# Patient Record
Sex: FEMALE | Race: BLACK OR AFRICAN AMERICAN | NOT HISPANIC OR LATINO | Employment: FULL TIME | URBAN - METROPOLITAN AREA
[De-identification: names, ages, dates, MRNs, and addresses within clinical notes are randomized per-mention and may not be internally consistent; named-entity substitution may affect disease eponyms.]

---

## 2024-06-24 ENCOUNTER — HOSPITAL ENCOUNTER (EMERGENCY)
Facility: OTHER | Age: 39
Discharge: HOME | End: 2024-06-24
Attending: EMERGENCY MEDICINE
Payer: MEDICAID

## 2024-06-24 VITALS
DIASTOLIC BLOOD PRESSURE: 68 MMHG | BODY MASS INDEX: 21.17 KG/M2 | SYSTOLIC BLOOD PRESSURE: 109 MMHG | HEART RATE: 70 BPM | OXYGEN SATURATION: 99 % | HEIGHT: 64 IN | RESPIRATION RATE: 18 BRPM | WEIGHT: 124 LBS | TEMPERATURE: 98 F

## 2024-06-24 DIAGNOSIS — R22.0 FACIAL SWELLING: ICD-10-CM

## 2024-06-24 DIAGNOSIS — J06.9 UPPER RESPIRATORY TRACT INFECTION, UNSPECIFIED TYPE: Primary | ICD-10-CM

## 2024-06-24 DIAGNOSIS — R06.02 SHORTNESS OF BREATH: ICD-10-CM

## 2024-06-24 DIAGNOSIS — Z86.39 HISTORY OF HYPERTHYROIDISM: ICD-10-CM

## 2024-06-24 LAB
ALBUMIN SERPL BCP-MCNC: 3.9 G/DL (ref 3.5–5.2)
ALP SERPL-CCNC: 43 U/L (ref 55–135)
ALT SERPL W/O P-5'-P-CCNC: 16 U/L (ref 10–44)
ANION GAP SERPL CALC-SCNC: 9 MMOL/L (ref 8–16)
AST SERPL-CCNC: 26 U/L (ref 10–40)
B-HCG UR QL: NEGATIVE
BASOPHILS # BLD AUTO: 0.02 K/UL (ref 0–0.2)
BASOPHILS NFR BLD: 0.4 % (ref 0–1.9)
BILIRUB SERPL-MCNC: 0.2 MG/DL (ref 0.1–1)
BILIRUB UR QL STRIP: NEGATIVE
BUN SERPL-MCNC: 9 MG/DL (ref 6–20)
CALCIUM SERPL-MCNC: 9 MG/DL (ref 8.7–10.5)
CHLORIDE SERPL-SCNC: 105 MMOL/L (ref 95–110)
CLARITY UR: CLEAR
CO2 SERPL-SCNC: 23 MMOL/L (ref 23–29)
COLOR UR: YELLOW
CREAT SERPL-MCNC: 1.1 MG/DL (ref 0.5–1.4)
CTP QC/QA: YES
DIFFERENTIAL METHOD BLD: ABNORMAL
EOSINOPHIL # BLD AUTO: 0.2 K/UL (ref 0–0.5)
EOSINOPHIL NFR BLD: 2.9 % (ref 0–8)
ERYTHROCYTE [DISTWIDTH] IN BLOOD BY AUTOMATED COUNT: 14.8 % (ref 11.5–14.5)
EST. GFR  (NO RACE VARIABLE): >60 ML/MIN/1.73 M^2
GLUCOSE SERPL-MCNC: 80 MG/DL (ref 70–110)
GLUCOSE UR QL STRIP: NEGATIVE
HCT VFR BLD AUTO: 34.1 % (ref 37–48.5)
HGB BLD-MCNC: 12.1 G/DL (ref 12–16)
HGB UR QL STRIP: ABNORMAL
IMM GRANULOCYTES # BLD AUTO: 0 K/UL (ref 0–0.04)
IMM GRANULOCYTES NFR BLD AUTO: 0 % (ref 0–0.5)
KETONES UR QL STRIP: NEGATIVE
LEUKOCYTE ESTERASE UR QL STRIP: NEGATIVE
LYMPHOCYTES # BLD AUTO: 1.8 K/UL (ref 1–4.8)
LYMPHOCYTES NFR BLD: 32.1 % (ref 18–48)
MCH RBC QN AUTO: 31.8 PG (ref 27–31)
MCHC RBC AUTO-ENTMCNC: 35.5 G/DL (ref 32–36)
MCV RBC AUTO: 90 FL (ref 82–98)
MONOCYTES # BLD AUTO: 0.6 K/UL (ref 0.3–1)
MONOCYTES NFR BLD: 11 % (ref 4–15)
NEUTROPHILS # BLD AUTO: 2.9 K/UL (ref 1.8–7.7)
NEUTROPHILS NFR BLD: 53.6 % (ref 38–73)
NITRITE UR QL STRIP: NEGATIVE
NRBC BLD-RTO: 0 /100 WBC
PH UR STRIP: 6 [PH] (ref 5–8)
PLATELET # BLD AUTO: 200 K/UL (ref 150–450)
PMV BLD AUTO: 10.3 FL (ref 9.2–12.9)
POC MOLECULAR INFLUENZA A AGN: NEGATIVE
POC MOLECULAR INFLUENZA B AGN: NEGATIVE
POTASSIUM SERPL-SCNC: 3.5 MMOL/L (ref 3.5–5.1)
PROT SERPL-MCNC: 7.6 G/DL (ref 6–8.4)
PROT UR QL STRIP: NEGATIVE
RBC # BLD AUTO: 3.81 M/UL (ref 4–5.4)
SARS-COV-2 RDRP RESP QL NAA+PROBE: NEGATIVE
SODIUM SERPL-SCNC: 137 MMOL/L (ref 136–145)
SP GR UR STRIP: 1.01 (ref 1–1.03)
T4 FREE SERPL-MCNC: <0.4 NG/DL (ref 0.71–1.51)
TSH SERPL DL<=0.005 MIU/L-ACNC: 119.23 UIU/ML (ref 0.4–4)
URN SPEC COLLECT METH UR: ABNORMAL
UROBILINOGEN UR STRIP-ACNC: NEGATIVE EU/DL
WBC # BLD AUTO: 5.46 K/UL (ref 3.9–12.7)

## 2024-06-24 PROCEDURE — 84443 ASSAY THYROID STIM HORMONE: CPT

## 2024-06-24 PROCEDURE — 87635 SARS-COV-2 COVID-19 AMP PRB: CPT | Performed by: EMERGENCY MEDICINE

## 2024-06-24 PROCEDURE — 25000003 PHARM REV CODE 250

## 2024-06-24 PROCEDURE — 80053 COMPREHEN METABOLIC PANEL: CPT

## 2024-06-24 PROCEDURE — 93005 ELECTROCARDIOGRAM TRACING: CPT

## 2024-06-24 PROCEDURE — 81003 URINALYSIS AUTO W/O SCOPE: CPT | Performed by: EMERGENCY MEDICINE

## 2024-06-24 PROCEDURE — 85025 COMPLETE CBC W/AUTO DIFF WBC: CPT

## 2024-06-24 PROCEDURE — 99285 EMERGENCY DEPT VISIT HI MDM: CPT | Mod: 25

## 2024-06-24 PROCEDURE — 81025 URINE PREGNANCY TEST: CPT

## 2024-06-24 PROCEDURE — 93010 ELECTROCARDIOGRAM REPORT: CPT | Mod: ,,, | Performed by: INTERNAL MEDICINE

## 2024-06-24 PROCEDURE — 96360 HYDRATION IV INFUSION INIT: CPT

## 2024-06-24 PROCEDURE — 84439 ASSAY OF FREE THYROXINE: CPT

## 2024-06-24 RX ORDER — ACETAMINOPHEN 500 MG
1000 TABLET ORAL
Status: COMPLETED | OUTPATIENT
Start: 2024-06-24 | End: 2024-06-24

## 2024-06-24 RX ADMIN — ACETAMINOPHEN 1000 MG: 500 TABLET ORAL at 11:06

## 2024-06-24 RX ADMIN — SODIUM CHLORIDE 1000 ML: 900 INJECTION, SOLUTION INTRAVENOUS at 12:06

## 2024-06-24 NOTE — DISCHARGE INSTRUCTIONS
Follow up with your primary care doctor. We will send a referral to endocrine to follow up with your history of hyperthyroidism. Return to the ED for new or worsening symptoms    76

## 2024-06-24 NOTE — ED PROVIDER NOTES
"Encounter Date: 6/24/2024       History     Chief Complaint   Patient presents with    Facial Swelling     Hx of hyperthyroidism. Swelling noted to throat and generalized face "for a while." Reports taking prescribed methimazole with no relief in symptoms. Pt also reporting SOB. Pt speaking in clear and complete sentences.      Pt is a 39 year old female with PMHx significant for hyperthyroidism and HIV who presents for evaluation of multiple complaints. She reports subjective fever, chills, cough, congestion, and rhinorrhea that have persisted for the last week. Reports recent sick contact with son with similar symptoms. States she noticed facial swelling 1 day ago, which prompted presentation to the ED. She denies chest pain, nausea, vomiting, abdominal pain, numbness, or weakness     The history is provided by the patient.     Review of patient's allergies indicates:   Allergen Reactions    Pcn [penicillins] Hives     Past Medical History:   Diagnosis Date    Asthma     Asthma 9/10/2020 11:57:30 AM    [facility] Historical - Respiratory: Asthma-No Additional Notes    Asthma 9/10/2020 11:57:30 AM    [facility] Historical - Respiratory: Asthma-No Additional Notes    Maternal anemia complicating pregnancy, childbirth, or the puerperium 9/10/2020 11:57:38 AM    [facility] Historical - Unknown: Sickle cell trait in mother affecting pregnancy-No Additional Notes    Maternal anemia complicating pregnancy, childbirth, or the puerperium 9/10/2020 11:57:38 AM    [facility] Historical - Unknown: Sickle cell trait in mother affecting pregnancy-No Additional Notes     History reviewed. No pertinent surgical history.  Family History   Problem Relation Name Age of Onset    Diabetes Mother      Hypertension Father       Social History     Tobacco Use    Smoking status: Every Day     Current packs/day: 0.50     Types: Cigarettes    Smokeless tobacco: Never   Substance Use Topics    Alcohol use: No    Drug use: No "     Review of Systems    Physical Exam     Initial Vitals [06/24/24 1108]   BP Pulse Resp Temp SpO2   107/74 87 18 98.1 °F (36.7 °C) 97 %      MAP       --         Physical Exam    Nursing note and vitals reviewed.  Constitutional: She appears well-developed and well-nourished. No distress.   HENT:   Head: Normocephalic and atraumatic.   Mouth/Throat: Oropharynx is clear and moist and mucous membranes are normal. No trismus in the jaw. No dental abscesses or uvula swelling. No posterior oropharyngeal edema, posterior oropharyngeal erythema or tonsillar abscesses.   Eyes: EOM are normal. Pupils are equal, round, and reactive to light.   Bilateral trace lower periorbital swelling without proptosis or exophthalmos    Neck: Neck supple. Thyromegaly present. No tracheal deviation present.   Normal range of motion.  Cardiovascular:  Normal rate, regular rhythm and intact distal pulses.           No murmur heard.  Pulmonary/Chest: Breath sounds normal. No stridor. No respiratory distress. She has no wheezes. She has no rales.   Abdominal: Abdomen is soft. She exhibits no distension. There is no abdominal tenderness. There is no rebound.   Musculoskeletal:         General: No edema. Normal range of motion.      Cervical back: Normal range of motion and neck supple.     Neurological: She is alert and oriented to person, place, and time.   Skin: Skin is warm and dry. Capillary refill takes less than 2 seconds.         ED Course   Procedures  Labs Reviewed   CBC W/ AUTO DIFFERENTIAL - Abnormal; Notable for the following components:       Result Value    RBC 3.81 (*)     Hematocrit 34.1 (*)     MCH 31.8 (*)     RDW 14.8 (*)     All other components within normal limits   COMPREHENSIVE METABOLIC PANEL - Abnormal; Notable for the following components:    Alkaline Phosphatase 43 (*)     All other components within normal limits   TSH - Abnormal; Notable for the following components:    .230 (*)     All other components  within normal limits   URINALYSIS, REFLEX TO URINE CULTURE - Abnormal; Notable for the following components:    Occult Blood UA Trace (*)     All other components within normal limits    Narrative:     Specimen Source->Urine   T4, FREE - Abnormal; Notable for the following components:    Free T4 <0.40 (*)     All other components within normal limits   POCT INFLUENZA A/B MOLECULAR   POCT URINE PREGNANCY   SARS-COV-2 RDRP GENE          Imaging Results              X-Ray Chest AP Portable (Final result)  Result time 06/24/24 11:46:25      Final result by Brenden Westfall III, MD (06/24/24 11:46:25)                   Impression:      No acute process seen.      Electronically signed by: Brenden Westfall MD  Date:    06/24/2024  Time:    11:46               Narrative:    EXAMINATION:  XR CHEST AP PORTABLE    CLINICAL HISTORY:  cough;    FINDINGS:  Chest one view:    There is borderline cardiomegaly.  Lungs are clear.  The bones demonstrate no abnormality.                                  (radiology reading, visualized by me)     Medications   acetaminophen tablet 1,000 mg (1,000 mg Oral Given 6/24/24 1136)   sodium chloride 0.9% bolus 1,000 mL 1,000 mL (0 mLs Intravenous Stopped 6/24/24 1324)     Medical Decision Making  Pt presents for multiple complaints including subjective fever, cough, and facial swelling. Ddx: viral syndrome, pneumonia, thyroid dysfunction, nephrotic syndrome, hepatic dysfunction. Pt well appearing and in no acute distress. Labs without leukocytosis or significant electrolyte abnormality. Covid and flu negative. CXR without acute process. No evidence of significant hepatic dysfunction or nephrotic syndrome. UA negative for infection. Labs with low T4 consistent with methimazole use. No evidence of myxedema coma on hx or physical exam. Symptoms are consistent with viral illness. Pt stable to discharge to home. Will have follow up with primary care for URI symptoms. Will send referral for  endocrinology for further management of hyperthyroidism     Amount and/or Complexity of Data Reviewed  Labs: ordered. Decision-making details documented in ED Course.  Radiology: ordered.    Risk  OTC drugs.              Attending Attestation:   Physician Attestation Statement for Resident:  As the supervising MD   Physician Attestation Statement: I have personally seen and examined this patient.   I agree with the above history.  -:   As the supervising MD I agree with the above PE.     As the supervising MD I agree with the above treatment, course, plan, and disposition.   -: Gaby Smith is a 39 y.o. female presenting with multiple complaints including primary concern regarding periorbital edema.  This is very subtle and difficult to objectively appreciate on exam.  There is no erythema or tenderness.  Extraocular movements intact.  I doubt periorbital or orbital cellulitis.  I do not think further facial or head imaging is indicated.  There is no sign of severe hyper or hypothyroidism.  On laboratory testing she is hypothyroid without sign of myxedema coma.  She is on methimazole.  Endocrine follow-up recommended.  She does have obvious goiter of her chronic nature without sign of airway compromise.  There is no stridor.  There is normal phonation.  She does have recent upper respiratory symptoms including cough and congestion suggestive of viral URI.  I doubt bacterial pneumonia.  I do not think antibiotics are indicated.  Chest x-ray reviewed.  EKG reviewed as well.  Very low suspicion for cardiac process such as ACS, CHF, myocarditis.  I doubt other life-threatening pulmonary process such as PE.  Other labs reviewed.  She is appropriate for PCP follow-up for this as well.  Symptomatic treatment as necessary reviewed.  There is no sign of significant hepatic or renal dysfunction in regard to subjective edema.  This could of course be due to chronic thyroid issues with no other emergent  intervention indicated.  Detailed return precautions reviewed.                   ED Course as of 06/24/24 1616   Mon Jun 24, 2024   1418 EKG:  Normal sinus rhythm at a rate of 74.  Normal intervals.  Right axis.  No significant ST or T wave changes suggesting acute ischemia or infarction.  (Independently interpreted by me)   [MR]      ED Course User Index  [MR] Markell Mills MD                             Clinical Impression:  Final diagnoses:  [R06.02] Shortness of breath  [J06.9] Upper respiratory tract infection, unspecified type (Primary)  [R22.0] Facial swelling  [Z86.39] History of hyperthyroidism          ED Disposition Condition    Discharge Stable          ED Prescriptions    None       Follow-up Information       Follow up With Specialties Details Why Contact Info    your primary care doctor  In 1 week               Luis M Rondon Jr., MD  Resident  06/24/24 [id number]

## 2024-06-25 LAB
OHS QRS DURATION: 74 MS
OHS QTC CALCULATION: 486 MS